# Patient Record
Sex: FEMALE | Race: WHITE | NOT HISPANIC OR LATINO | Employment: UNEMPLOYED | URBAN - METROPOLITAN AREA
[De-identification: names, ages, dates, MRNs, and addresses within clinical notes are randomized per-mention and may not be internally consistent; named-entity substitution may affect disease eponyms.]

---

## 2017-03-13 ENCOUNTER — HISTORICAL (OUTPATIENT)
Dept: ADMINISTRATIVE | Facility: HOSPITAL | Age: 57
End: 2017-03-13

## 2017-03-13 LAB
ALBUMIN SERPL-MCNC: 4.5 G/DL (ref 3.1–4.7)
ALP SERPL-CCNC: 94 IU/L (ref 40–104)
ALT (SGPT): 23 IU/L (ref 3–33)
AST SERPL-CCNC: 27 IU/L (ref 10–40)
BASOPHILS NFR BLD: 0.1 K/UL (ref 0–0.2)
BASOPHILS NFR BLD: 0.9 %
BILIRUB SERPL-MCNC: 1.7 MG/DL (ref 0.3–1)
BUN SERPL-MCNC: 12 MG/DL (ref 8–20)
CALCIUM SERPL-MCNC: 9.6 MG/DL (ref 7.7–10.4)
CHLORIDE: 102 MMOL/L (ref 98–110)
CO2 SERPL-SCNC: 26.8 MMOL/L (ref 22.8–31.6)
CREATININE: 0.59 MG/DL (ref 0.6–1.4)
CRP SERPL-MCNC: 0.49 MG/DL (ref 0–1.4)
EOSINOPHIL NFR BLD: 0 K/UL (ref 0–0.7)
EOSINOPHIL NFR BLD: 0.6 %
ERYTHROCYTE [DISTWIDTH] IN BLOOD BY AUTOMATED COUNT: 13.3 % (ref 11.7–14.9)
GLUCOSE: 107 MG/DL (ref 70–99)
GRAN #: 3.5 K/UL (ref 1.4–6.5)
GRAN%: 52.8 %
HCT VFR BLD AUTO: 36.5 % (ref 36–48)
HGB BLD-MCNC: 12.3 G/DL (ref 12–15)
IMMATURE GRANS (ABS): 0 K/UL (ref 0–1)
IMMATURE GRANULOCYTES: 0.3 %
LYMPH #: 2.5 K/UL (ref 1.2–3.4)
LYMPH%: 38.1 %
MCH RBC QN AUTO: 28 PG (ref 25–35)
MCHC RBC AUTO-ENTMCNC: 33.7 G/DL (ref 31–36)
MCV RBC AUTO: 83 FL (ref 79–98)
MONO #: 0.5 K/UL (ref 0.1–0.6)
MONO%: 7.3 %
NUCLEATED RBCS: 0 %
PLATELET # BLD AUTO: 234 K/UL (ref 140–440)
PMV BLD AUTO: 10.3 FL (ref 8.8–12.7)
POTASSIUM SERPL-SCNC: 4 MMOL/L (ref 3.5–5)
PROT SERPL-MCNC: 7.6 G/DL (ref 6–8.2)
RBC # BLD AUTO: 4.4 M/UL (ref 3.5–5.5)
SODIUM: 139 MMOL/L (ref 134–144)
WBC # BLD AUTO: 6.6 K/UL (ref 5–10)

## 2017-03-15 LAB — RHEUMATOID FACT SERPL-ACNC: 11.3 IU/ML (ref 0–13.9)

## 2017-07-17 ENCOUNTER — OFFICE VISIT (OUTPATIENT)
Dept: RHEUMATOLOGY | Facility: CLINIC | Age: 57
End: 2017-07-17
Payer: MEDICARE

## 2017-07-17 DIAGNOSIS — M19.90 CHRONIC INFLAMMATORY ARTHRITIS: Primary | ICD-10-CM

## 2017-07-17 DIAGNOSIS — Z79.899 ENCOUNTER FOR LONG-TERM (CURRENT) USE OF OTHER MEDICATIONS: ICD-10-CM

## 2017-07-17 LAB
ALBUMIN SERPL-MCNC: 4.5 G/DL (ref 3.1–4.7)
ALP SERPL-CCNC: 93 IU/L (ref 40–104)
ALT (SGPT): 23 IU/L (ref 3–33)
AST SERPL-CCNC: 27 IU/L (ref 10–40)
BASOPHILS NFR BLD: 0 K/UL (ref 0–0.2)
BASOPHILS NFR BLD: 0.4 %
BILIRUB SERPL-MCNC: 1.3 MG/DL (ref 0.3–1)
BUN SERPL-MCNC: 15 MG/DL (ref 8–20)
CALCIUM SERPL-MCNC: 9.8 MG/DL (ref 7.7–10.4)
CHLORIDE: 103 MMOL/L (ref 98–110)
CO2 SERPL-SCNC: 24.1 MMOL/L (ref 22.8–31.6)
CREATININE: 0.62 MG/DL (ref 0.6–1.4)
CRP SERPL-MCNC: 0.65 MG/DL (ref 0–1.4)
EOSINOPHIL NFR BLD: 0 K/UL (ref 0–0.7)
EOSINOPHIL NFR BLD: 0.6 %
ERYTHROCYTE [DISTWIDTH] IN BLOOD BY AUTOMATED COUNT: 13.2 % (ref 11.7–14.9)
GLUCOSE: 208 MG/DL (ref 70–99)
GRAN #: 4.5 K/UL (ref 1.4–6.5)
GRAN%: 62.5 %
HCT VFR BLD AUTO: 37.1 % (ref 36–48)
HGB BLD-MCNC: 12.6 G/DL (ref 12–15)
IMMATURE GRANS (ABS): 0 K/UL (ref 0–1)
IMMATURE GRANULOCYTES: 0.1 %
LYMPH #: 2.2 K/UL (ref 1.2–3.4)
LYMPH%: 30.3 %
MCH RBC QN AUTO: 28.1 PG (ref 25–35)
MCHC RBC AUTO-ENTMCNC: 34 G/DL (ref 31–36)
MCV RBC AUTO: 82.6 FL (ref 79–98)
MONO #: 0.4 K/UL (ref 0.1–0.6)
MONO%: 6.1 %
NUCLEATED RBCS: 0 %
PLATELET # BLD AUTO: 218 K/UL (ref 140–440)
PMV BLD AUTO: 10.5 FL (ref 8.8–12.7)
POTASSIUM SERPL-SCNC: 3.1 MMOL/L (ref 3.5–5)
PROT SERPL-MCNC: 7.9 G/DL (ref 6–8.2)
RBC # BLD AUTO: 4.49 M/UL (ref 3.5–5.5)
SODIUM: 140 MMOL/L (ref 134–144)
WBC # BLD AUTO: 7.3 K/UL (ref 5–10)

## 2017-07-17 PROCEDURE — 99213 OFFICE O/P EST LOW 20 MIN: CPT | Mod: ,,, | Performed by: INTERNAL MEDICINE

## 2017-07-17 RX ORDER — METHOTREXATE 2.5 MG/1
TABLET ORAL
COMMUNITY
Start: 2014-09-17 | End: 2018-03-28 | Stop reason: SDUPTHER

## 2017-07-17 RX ORDER — FOLIC ACID 1 MG/1
TABLET ORAL
COMMUNITY

## 2017-07-17 RX ORDER — GABAPENTIN 100 MG/1
CAPSULE ORAL
COMMUNITY
End: 2020-06-16 | Stop reason: DRUGHIGH

## 2017-07-17 RX ORDER — RAMIPRIL 2.5 MG/1
CAPSULE ORAL
COMMUNITY

## 2017-07-17 RX ORDER — METHOCARBAMOL 750 MG/1
TABLET, FILM COATED ORAL
Refills: 1 | COMMUNITY
Start: 2017-05-24 | End: 2018-03-28 | Stop reason: DRUGHIGH

## 2017-07-17 RX ORDER — DULOXETIN HYDROCHLORIDE 30 MG/1
CAPSULE, DELAYED RELEASE ORAL
COMMUNITY

## 2017-07-17 RX ORDER — CLOPIDOGREL BISULFATE 75 MG/1
TABLET ORAL
COMMUNITY

## 2017-07-17 RX ORDER — HYDROCODONE BITARTRATE AND ACETAMINOPHEN 7.5; 325 MG/1; MG/1
10-325 TABLET ORAL DAILY PRN
COMMUNITY
End: 2018-03-28 | Stop reason: DRUGHIGH

## 2017-07-17 RX ORDER — ZOLPIDEM TARTRATE 10 MG/1
TABLET ORAL
COMMUNITY
End: 2017-08-07 | Stop reason: SDUPTHER

## 2017-07-17 RX ORDER — METFORMIN HYDROCHLORIDE 500 MG/1
TABLET ORAL
COMMUNITY

## 2017-07-17 NOTE — PROGRESS NOTES
[facility] RHEUMATOLOGY           Follow-up visit      Subjective:       Patient ID:   NAME: Gillian Ramírez : 1960     57 y.o. female    Referring Doc: No ref. provider found  Other Physicians:    Chief Complaint:  Rheumatoid Arthritis, Osteoarthritis- her chief complaint is ongoing pain of the right knee    HPI/Interval History:   In general, the patient is doing well with regard to her inflammatory arthritis. She has had no new joint swelling, no joint redness and very limited joint pain. She has however had ongoing problems with the right knee which we know is secondary to osteoarthritis. She is seeing her orthopedist, Dr. Quiñones, and they are ably planning on performing a TKR on that side. They have not however addressed her very significant cardiac issues and so I believe the surgery is questionable.          ROS:   GEN: no fever, night sweats or weight loss  SKIN:  no rashes , erythema, bruising, or swelling, no Raynauds, no photosensitivity  HEENT: no HAs, no changes in vision, no mouth ulcers, no sicca symptoms, no scalp tenderness, jaw claudication.  CV: no CP, SOB, PND, SHARIF or orthopnea,no palpitations  PULM: no SOB, cough, hemoptysis, sputum or pleuritic pain  GI: no abdominal pain, nausea, vomiting, constipation, diarrhea, melanotic stools, BRBPR, or hematemesis.no dysphagia  : no hematuria, dysuria  NEURO:no paresthesias, headaches, visual disturbances, muscle weakness  MUSCULOSKELETAL:  Mostly right knee pain when walking too far. No new joint swelling or stiffness.  PSYCH: No insomnia, no significant depression, no anxiety    Medications:    Current Outpatient Prescriptions:     clopidogrel (PLAVIX) 75 mg tablet, Take by mouth., Disp: , Rfl:     duloxetine (CYMBALTA) 30 MG capsule, Take by mouth., Disp: , Rfl:     etanercept (ENBREL SURECLICK) 50 mg/mL (0.98 mL) PnIj, Inject into the skin., Disp: , Rfl:     folic acid (FOLVITE) 1 MG tablet, Take by mouth., Disp: , Rfl:     gabapentin  (NEURONTIN) 100 MG capsule, Take by mouth., Disp: , Rfl:     hydrocodone-acetaminophen 7.5-325mg (NORCO) 7.5-325 mg per tablet, Take  mg by mouth daily as needed., Disp: , Rfl:     metformin (GLUCOPHAGE) 500 MG tablet, Take by mouth., Disp: , Rfl:     methocarbamol (ROBAXIN) 750 MG Tab, TAKE 1 TABLET BY MOUTH 4 TIMES A DAY AS NEEDED FOR MUSCLE SPASM, Disp: , Rfl: 1    methotrexate 2.5 MG Tab, Take by mouth., Disp: , Rfl:     ramipril (ALTACE) 2.5 MG capsule, Take by mouth., Disp: , Rfl:     ROSUVASTATIN CALCIUM (ROSUVASTATIN ORAL), Take 20 mg by mouth once daily., Disp: , Rfl:     zolpidem (AMBIEN) 10 mg Tab, Take by mouth., Disp: , Rfl:   FAMILY HISTORY: negative for Connective Tissue Disease        Review of patient's allergies indicates:  No Known Allergies          Objective:     Vitals:  There were no vitals taken for this visit.    Physical Examination:   GEN: wn/wd female in no apparent distress; AAOx3  SKIN: no rashes, no lesions, no sclerodactyly, no Raynaud's, no periungual erythema  HEAD: no alopecia, no scalp tenderness, no temporal artery tenderness or induration.  EYES: no pallor, no icterus, PERRLA  ENT:  no thrush, no mucosal dryness or ulcerations, adequate oral hygiene & dentition.  NECK: supple x 6, no masses, no thyromegaly, no lymphadenopathy.  CV:   S1 and S2 regular, no murmurs, gallop or rubs  CHEST: Normal respiratory effort;  normal breath sounds/no adventitious sounds. No signs of consolidation.  ABD: non-tender and non-distended; soft; normal bowel sounds; no rebound/guarding or tenderness. No hepatosplenomegaly.  Musculoskeletal:   Chronic synovial proliferation and stable.significant chronic deformities are still present in both hands. There does not appear to be any active synovitis at this time.  EXTREM: no clubbing, cyanosis or edema. normal pulses.  NEURO: grossly intact; motor/sensory WNL; AAOx3; no tremors  PSYCH: normal mood, affect and behavior            Labs:    Lab Results   Component Value Date    WBC 6.6 03/13/2017    HGB 12.3 03/13/2017    HCT 36.5 03/13/2017    MCV 83.0 03/13/2017     03/13/2017   CMP@  Sodium   Date Value Ref Range Status   03/13/2017 139 134 - 144 mmol/L      Potassium   Date Value Ref Range Status   03/13/2017 4.0 3.5 - 5.0 mmol/L      Chloride   Date Value Ref Range Status   03/13/2017 102 98 - 110 mmol/L      CO2   Date Value Ref Range Status   03/13/2017 26.8 22.8 - 31.6 mmol/L      Glucose   Date Value Ref Range Status   03/13/2017 107 (H) 70 - 99 mg/dL      BUN, Bld   Date Value Ref Range Status   03/13/2017 12 8 - 20 mg/dL      Creatinine   Date Value Ref Range Status   03/13/2017 0.59 (L) 0.60 - 1.40 mg/dL      Calcium   Date Value Ref Range Status   03/13/2017 9.6 7.7 - 10.4 mg/dL      Total Protein   Date Value Ref Range Status   03/13/2017 7.6 6.0 - 8.2 g/dL      Albumin   Date Value Ref Range Status   03/13/2017 4.5 3.1 - 4.7 g/dL      Total Bilirubin   Date Value Ref Range Status   03/13/2017 1.7 (H) 0.3 - 1.0 mg/dL      Alkaline Phosphatase   Date Value Ref Range Status   03/13/2017 94 40 - 104 IU/L      AST   Date Value Ref Range Status   03/13/2017 27 10 - 40 IU/L      CRP   Date Value Ref Range Status   03/13/2017 0.49 0.00 - 1.40 mg/dL      Rheumatoid Factor   Date Value Ref Range Status   03/13/2017 11.3 0.0 - 13.9 IU/mL      Comment:     Performed at: [facility], [facility] [address], [id number] Can MD, Phone:  [phone]         Radiology/Diagnostic Studies:    No new x-ray studies were provided to me.    Assessment/Discussion/Plan:   57 y.o. female with inflammatory arthritis plus osteoarthritis, end-stage of the right knee.        PLAN:  She has adequate control of her inflammatory arthritis with Biologics. Her degenerative disease continues to worsen and she will soon have to confront the possibility of surgery. She of course understands that no surgery will be performed unless  her cardiologist clears her.  All routine blood testing was obtained today.      RTC:  I will see her back in 4 months.      Electronically signed by Jed Knox MD

## 2017-07-19 LAB — RHEUMATOID FACT SERPL-ACNC: <10 IU/ML (ref 0–13.9)

## 2017-07-20 ENCOUNTER — TELEPHONE (OUTPATIENT)
Dept: RHEUMATOLOGY | Facility: CLINIC | Age: 57
End: 2017-07-20

## 2017-07-20 NOTE — TELEPHONE ENCOUNTER
Pt notified of low potassium level. Pt to contact cardiologist for treatment /instructions. Pt verbalizes understanding of results and instructions to f/u with cardiologist. shama

## 2017-08-07 DIAGNOSIS — G47.00 INSOMNIA, UNSPECIFIED TYPE: Primary | ICD-10-CM

## 2017-08-07 RX ORDER — ZOLPIDEM TARTRATE 5 MG/1
5 TABLET ORAL NIGHTLY
Qty: 30 TABLET | Refills: 5 | Status: SHIPPED | OUTPATIENT
Start: 2017-08-07 | End: 2018-02-05 | Stop reason: SDUPTHER

## 2017-11-17 RX ORDER — ETANERCEPT 50 MG/ML
SOLUTION SUBCUTANEOUS
Qty: 4 SYRINGE | Refills: 5 | Status: SHIPPED | OUTPATIENT
Start: 2017-11-17 | End: 2019-06-05

## 2017-11-29 ENCOUNTER — OFFICE VISIT (OUTPATIENT)
Dept: RHEUMATOLOGY | Facility: CLINIC | Age: 57
End: 2017-11-29
Payer: MEDICARE

## 2017-11-29 ENCOUNTER — TELEPHONE (OUTPATIENT)
Dept: RHEUMATOLOGY | Facility: CLINIC | Age: 57
End: 2017-11-29

## 2017-11-29 VITALS
DIASTOLIC BLOOD PRESSURE: 76 MMHG | WEIGHT: 178.31 LBS | BODY MASS INDEX: 31.58 KG/M2 | SYSTOLIC BLOOD PRESSURE: 136 MMHG

## 2017-11-29 DIAGNOSIS — M05.79 RHEUMATOID ARTHRITIS INVOLVING MULTIPLE SITES WITH POSITIVE RHEUMATOID FACTOR: Primary | ICD-10-CM

## 2017-11-29 DIAGNOSIS — M05.79 RHEUMATOID ARTHRITIS OF MULTIPLE SITES WITHOUT ORGAN OR SYSTEM INVOLVEMENT WITH POSITIVE RHEUMATOID FACTOR: Primary | ICD-10-CM

## 2017-11-29 DIAGNOSIS — Z79.899 ENCOUNTER FOR LONG-TERM (CURRENT) DRUG USE: ICD-10-CM

## 2017-11-29 DIAGNOSIS — Z79.899 ENCOUNTER FOR LONG-TERM (CURRENT) USE OF HIGH-RISK MEDICATION: ICD-10-CM

## 2017-11-29 DIAGNOSIS — I25.118 CORONARY ARTERY DISEASE WITH EXERTIONAL ANGINA: ICD-10-CM

## 2017-11-29 LAB
ALBUMIN SERPL-MCNC: 4.1 G/DL (ref 3.1–4.7)
ALP SERPL-CCNC: 84 IU/L (ref 40–104)
ALT (SGPT): 24 IU/L (ref 3–33)
AST SERPL-CCNC: 32 IU/L (ref 10–40)
BASOPHILS NFR BLD: 0 K/UL (ref 0–0.2)
BASOPHILS NFR BLD: 0.5 %
BILIRUB SERPL-MCNC: 1.3 MG/DL (ref 0.3–1)
BUN SERPL-MCNC: 9 MG/DL (ref 8–20)
CALCIUM SERPL-MCNC: 9.3 MG/DL (ref 7.7–10.4)
CHLORIDE: 105 MMOL/L (ref 98–110)
CK SERPL-CCNC: 88 IU/L (ref 13–135)
CO2 SERPL-SCNC: 23.7 MMOL/L (ref 22.8–31.6)
CREATININE: 0.55 MG/DL (ref 0.6–1.4)
CRP SERPL-MCNC: 0.32 MG/DL (ref 0–1.4)
EOSINOPHIL NFR BLD: 0.1 K/UL (ref 0–0.7)
EOSINOPHIL NFR BLD: 0.8 %
ERYTHROCYTE [DISTWIDTH] IN BLOOD BY AUTOMATED COUNT: 13.4 % (ref 11.7–14.9)
GLUCOSE: 187 MG/DL (ref 70–99)
GRAN #: 3.5 K/UL (ref 1.4–6.5)
GRAN%: 58.4 %
HCT VFR BLD AUTO: 34.2 % (ref 36–48)
HGB BLD-MCNC: 11.5 G/DL (ref 12–15)
IMMATURE GRANS (ABS): 0 K/UL (ref 0–1)
IMMATURE GRANULOCYTES: 0.5 %
LYMPH #: 2.1 K/UL (ref 1.2–3.4)
LYMPH%: 34.6 %
MCH RBC QN AUTO: 28 PG (ref 25–35)
MCHC RBC AUTO-ENTMCNC: 33.6 G/DL (ref 31–36)
MCV RBC AUTO: 83.2 FL (ref 79–98)
MONO #: 0.3 K/UL (ref 0.1–0.6)
MONO%: 5.2 %
NUCLEATED RBCS: 0 %
PLATELET # BLD AUTO: 244 K/UL (ref 140–440)
PMV BLD AUTO: 9.9 FL (ref 8.8–12.7)
POTASSIUM SERPL-SCNC: 3.7 MMOL/L (ref 3.5–5)
PROT SERPL-MCNC: 7.1 G/DL (ref 6–8.2)
RBC # BLD AUTO: 4.11 M/UL (ref 3.5–5.5)
SODIUM: 139 MMOL/L (ref 134–144)
WBC # BLD AUTO: 6 K/UL (ref 5–10)

## 2017-11-29 PROCEDURE — 99214 OFFICE O/P EST MOD 30 MIN: CPT | Mod: ,,, | Performed by: INTERNAL MEDICINE

## 2017-11-29 NOTE — PROGRESS NOTES
Please send a copy of these blood tests to the patient's cardiologist, Dr. Chaudhry. Call and verify that these results have been received.. Let them know that the patient was here this morning with what sounds like exertional angina and that she was told to see the doctor today or to present to the emergency room. Document.  Thank you

## 2017-11-29 NOTE — PROGRESS NOTES
[facility] RHEUMATOLOGY           Follow-up visit      Subjective:       Patient ID:   NAME: Gillian Ramírez : 1960     57 y.o. female    Referring Doc: No ref. provider found  Other Physicians:    Chief Complaint:  chronic inflammatory arthritis      HPI/Interval History:    The patient is having no problems with her arthritis. She denies any red, hot, and/or swollen joints. She has been having some burning sensation in her chest. This is clearly, in her opinion, exertional. She relates having had similar discomfort just prior to her first bypass surgery. She apparently had an angiogram routinely a month ago and was told she had several blockages though none were significant enough to stent. She has contacted the office of her cardiologist, Dr. Chaudhry, and is hoping to see him this week.          ROS:   GEN:    no fever, night sweats or weight loss  SKIN:   no rashes , erythema, bruising, or swelling, no Raynauds, no photosensitivity  HEENT: no HAs, no changes in vision, no mouth ulcers, no sicca symptoms, no scalp tenderness, jaw claudication.  CV:        ++ CP, no SOB, PND, SHARIF or orthopnea,no palpitations  PULM:    no SOB, cough, hemoptysis, sputum or pleuritic pain  GI:      no abdominal pain, nausea, vomiting, constipation, diarrhea, melanotic stools, BRBPR, or hematemesis, no dysphagia, no GERD  :     no hematuria, dysuria  NEURO: no paresthesias, headaches, visual disturbances  MUSCULOSKELETAL:  no muscle or joint problems  PSYCH:   No insomnia, no significant depression, no anxiety    Medications:    Current Outpatient Prescriptions:     clopidogrel (PLAVIX) 75 mg tablet, Take by mouth., Disp: , Rfl:     duloxetine (CYMBALTA) 30 MG capsule, Take by mouth., Disp: , Rfl:     ENBREL SURECLICK 50 mg/mL (0.98 mL) PnIj, INJECT 1 PEN (50MG) SUBCUTANEOUSLY ONCE A WEEK, Disp: 4 Syringe, Rfl: 5    folic acid (FOLVITE) 1 MG tablet, Take by mouth., Disp: , Rfl:     gabapentin (NEURONTIN) 100 MG capsule,  Take by mouth., Disp: , Rfl:     hydrocodone-acetaminophen 7.5-325mg (NORCO) 7.5-325 mg per tablet, Take  mg by mouth daily as needed., Disp: , Rfl:     metformin (GLUCOPHAGE) 500 MG tablet, Take by mouth., Disp: , Rfl:     methocarbamol (ROBAXIN) 750 MG Tab, TAKE 1 TABLET BY MOUTH 4 TIMES A DAY AS NEEDED FOR MUSCLE SPASM, Disp: , Rfl: 1    methotrexate 2.5 MG Tab, Take by mouth., Disp: , Rfl:     ramipril (ALTACE) 2.5 MG capsule, Take by mouth., Disp: , Rfl:     ROSUVASTATIN CALCIUM (ROSUVASTATIN ORAL), Take 20 mg by mouth once daily., Disp: , Rfl:     zolpidem (AMBIEN) 5 MG Tab, Take 1 tablet (5 mg total) by mouth every evening. DOSE DECREASE!, Disp: 30 tablet, Rfl: 5      FAMILY HISTORY: negative for Connective Tissue Disease        Review of patient's allergies indicates:  No Known Allergies          Objective:     Vitals:  Blood pressure 136/76, weight 80.9 kg (178 lb 4.8 oz).    Physical Examination:   GEN: wn/wd female in no apparent distress  SKIN: no rashes, no lesions, no sclerodactyly, no Raynaud's, no periungual erythema  HEAD: no alopecia, no scalp tenderness, no temporal artery tenderness or induration.  EYES: no pallor, no icterus, PERRLA  ENT:  no thrush, no mucosal dryness or ulcerations, adequate oral hygiene & dentition.  NECK: supple x 6, no masses, no thyromegaly, no lymphadenopathy.  CV:   S1 and S2 regular, no murmurs, gallop or rubs  CHEST: Normal respiratory effort;  normal breath sounds/no adventitious sounds. No signs of consolidation.  ABD: non-tender and non-distended; soft; normal bowel sounds; no rebound/guarding or tenderness. No hepatosplenomegaly.  Musculoskeletal:  No evidence of any active synovitis  EXTREM: no clubbing, cyanosis or edema. normal pulses.  NEURO: grossly intact; motor/sensory WNL; AAOx3; no tremors  PSYCH:  normal mood, affect and behavior            Labs:   Lab Results   Component Value Date    WBC 6.0 11/29/2017    HGB 11.5 (L) 11/29/2017    HCT 34.2  (L) 11/29/2017    MCV 83.2 11/29/2017     11/29/2017   CMP@  Sodium   Date Value Ref Range Status   11/29/2017 139 134 - 144 mmol/L      Potassium   Date Value Ref Range Status   11/29/2017 3.7 3.5 - 5.0 mmol/L      Chloride   Date Value Ref Range Status   11/29/2017 105 98 - 110 mmol/L      CO2   Date Value Ref Range Status   11/29/2017 23.7 22.8 - 31.6 mmol/L      Glucose   Date Value Ref Range Status   11/29/2017 187 (H) 70 - 99 mg/dL      BUN, Bld   Date Value Ref Range Status   07/17/2017 15 8 - 20 mg/dL      Creatinine   Date Value Ref Range Status   07/17/2017 0.62 0.60 - 1.40 mg/dL      Calcium   Date Value Ref Range Status   11/29/2017 9.3 7.7 - 10.4 mg/dL      Total Protein   Date Value Ref Range Status   07/17/2017 7.9 6.0 - 8.2 g/dL      Albumin   Date Value Ref Range Status   07/17/2017 4.5 3.1 - 4.7 g/dL      Total Bilirubin   Date Value Ref Range Status   07/17/2017 1.3 (H) 0.3 - 1.0 mg/dL      Alkaline Phosphatase   Date Value Ref Range Status   07/17/2017 93 40 - 104 IU/L      AST   Date Value Ref Range Status   07/17/2017 27 10 - 40 IU/L      CRP   Date Value Ref Range Status   07/17/2017 0.65 0.00 - 1.40 mg/dL      Rheumatoid Factor   Date Value Ref Range Status   07/17/2017 <10.0 0.0 - 13.9 IU/mL      Comment:     Performed at: [facility], [facility] [address], [id number] Can MD, Phone:  [phone]         Radiology/Diagnostic Studies:    none    Assessment/Discussion/Plan:   57 y.o. female with chronic rheumatoid arthritis, seronegative-controlled.  (2) Long history of coronary artery disease since age 39, now having exertional angina.    PLAN:  I requested that the patient be taken to the [facility] emergency room She declined. She stated that she will go to her cardiologist's office when she leaves here and if she is unable to be seen, she will then go to the emergency room. She has nitroglycerin, though she has not used it. I have encouraged her to keep  it handy so that she can use it as soon as any exertional chest pain recurs.  I have drawn her regular arthritis blood tests and have included cardiac isoenzymes which I will send to her cardiologist's office as soon as they are ready.    RTC:  I will see her back in 4 months.      Electronically signed by Jed Knox MD         copy of this note will be sent to Dr. Chaudhry

## 2017-11-29 NOTE — TELEPHONE ENCOUNTER
Spoke to Jolie in Dr. Chaudhry's office and she confirmed the fax number and was informed to please provide Dr. Chaudhry with the patient's lab results as soon as they are received. Dr. Knox's comment on lab result was starred for Dr. Chaudhry's attention.  Fax was scanned into media tab.

## 2018-02-05 DIAGNOSIS — G47.00 INSOMNIA, UNSPECIFIED TYPE: ICD-10-CM

## 2018-02-05 RX ORDER — ZOLPIDEM TARTRATE 5 MG/1
TABLET ORAL
Qty: 30 TABLET | Refills: 5 | Status: SHIPPED | OUTPATIENT
Start: 2018-02-05 | End: 2018-05-07 | Stop reason: SDUPTHER

## 2018-03-28 ENCOUNTER — OFFICE VISIT (OUTPATIENT)
Dept: RHEUMATOLOGY | Facility: CLINIC | Age: 58
End: 2018-03-28
Payer: MEDICARE

## 2018-03-28 VITALS
WEIGHT: 162.19 LBS | DIASTOLIC BLOOD PRESSURE: 96 MMHG | SYSTOLIC BLOOD PRESSURE: 140 MMHG | BODY MASS INDEX: 28.73 KG/M2

## 2018-03-28 DIAGNOSIS — I25.118 CORONARY ARTERY DISEASE WITH EXERTIONAL ANGINA: ICD-10-CM

## 2018-03-28 DIAGNOSIS — M05.79 RHEUMATOID ARTHRITIS OF MULTIPLE SITES WITHOUT ORGAN OR SYSTEM INVOLVEMENT WITH POSITIVE RHEUMATOID FACTOR: Primary | ICD-10-CM

## 2018-03-28 DIAGNOSIS — Z79.899 ENCOUNTER FOR LONG-TERM (CURRENT) DRUG USE: ICD-10-CM

## 2018-03-28 PROCEDURE — 99214 OFFICE O/P EST MOD 30 MIN: CPT | Mod: ,,, | Performed by: INTERNAL MEDICINE

## 2018-03-28 RX ORDER — HYDROCODONE BITARTRATE AND ACETAMINOPHEN 10; 325 MG/1; MG/1
TABLET ORAL
COMMUNITY
Start: 2018-03-01

## 2018-03-28 RX ORDER — METHOTREXATE 2.5 MG/1
20 TABLET ORAL
Qty: 96 TABLET | Refills: 1 | Status: SHIPPED | OUTPATIENT
Start: 2018-03-28 | End: 2019-06-05

## 2018-03-28 RX ORDER — TIZANIDINE 4 MG/1
TABLET ORAL
COMMUNITY
Start: 2018-03-27

## 2018-03-28 RX ORDER — NITROGLYCERIN 20 MG/1
PATCH TRANSDERMAL
COMMUNITY
Start: 2018-03-05

## 2018-03-28 RX ORDER — METOPROLOL TARTRATE 25 MG/1
TABLET, FILM COATED ORAL 3 TIMES DAILY
COMMUNITY
Start: 2018-02-16

## 2018-03-28 RX ORDER — RANOLAZINE 500 MG/1
TABLET, FILM COATED, EXTENDED RELEASE ORAL
COMMUNITY
Start: 2018-03-24

## 2018-03-28 RX ORDER — ESOMEPRAZOLE MAGNESIUM 40 MG/1
40 CAPSULE, DELAYED RELEASE ORAL
COMMUNITY

## 2018-03-28 RX ORDER — DILTIAZEM HYDROCHLORIDE 240 MG/1
CAPSULE, COATED, EXTENDED RELEASE ORAL
COMMUNITY
Start: 2018-03-27

## 2018-03-28 RX ORDER — ROSUVASTATIN CALCIUM 20 MG/1
TABLET, COATED ORAL
COMMUNITY
Start: 2018-03-26 | End: 2020-06-16 | Stop reason: DRUGHIGH

## 2018-03-28 RX ORDER — POTASSIUM CHLORIDE 1500 MG/1
TABLET, EXTENDED RELEASE ORAL
COMMUNITY
Start: 2018-03-03

## 2018-03-28 RX ORDER — EZETIMIBE 10 MG/1
TABLET ORAL
COMMUNITY
Start: 2018-03-07

## 2018-03-28 NOTE — PROGRESS NOTES
[facility] RHEUMATOLOGY           Follow-up visit      Subjective:       Patient ID:   NAME: Gillian Ramírez : 1960     57 y.o. female    Referring Doc: No ref. provider found  Other Physicians:    Chief Complaint:  Rheumatoid Arthritis      HPI/Interval History:   The patient has been having more problems with joint pain and swelling. She has had some additional problems with the left knee. He saw orthopedics and received a cortisone injection which was helpful. About a month later, she received a hyaluronic acid injection. This too has been helpful.  She has once again had problems with her heart area it seems that she had been having very significant episodes of angina. The workup uncovered several stenoses, the tightest of which was 75% occluded. Medical management however did not succeed and she had a stent placed about 1 week ago. She now feels much better.        ROS:   GEN:    no fever, night sweats or weight loss  SKIN:   no rashes , erythema, bruising, or swelling, no Raynauds, no photosensitivity  HEENT: no HAs, no changes in vision, no mouth ulcers, no sicca symptoms, no scalp tenderness, jaw claudication.  CV:        ++ CP, SOB, no PND, + SHARIF or orthopnea, ++ palpitations  PULM: no SOB, cough, hemoptysis, sputum or pleuritic pain  GI:      no abdominal pain, nausea, vomiting, constipation, diarrhea, melanotic stools, BRBPR, or hematemesis, no dysphagia, no GERD  :     no hematuria, dysuria  NEURO: no paresthesias, headaches, visual disturbances  MUSCULOSKELETAL:  Pain and stiffness with swelling and warmth in the knuckles of both hands and in the left knee  PSYCH:   No insomnia, no significant depression, no anxiety    Medications:    Current Outpatient Prescriptions:     clopidogrel (PLAVIX) 75 mg tablet, Take by mouth., Disp: , Rfl:     diltiaZEM (CARDIZEM CD) 240 MG 24 hr capsule, , Disp: , Rfl:     duloxetine (CYMBALTA) 30 MG capsule, Take by mouth., Disp: , Rfl:     ENBREL SURECLICK 50  mg/mL (0.98 mL) AlfonsoIj, INJECT 1 PEN (50MG) SUBCUTANEOUSLY ONCE A WEEK, Disp: 4 Syringe, Rfl: 5    esomeprazole (NEXIUM) 40 MG capsule, Take 40 mg by mouth before breakfast., Disp: , Rfl:     ezetimibe (ZETIA) 10 mg tablet, , Disp: , Rfl:     folic acid (FOLVITE) 1 MG tablet, Take by mouth., Disp: , Rfl:     gabapentin (NEURONTIN) 100 MG capsule, Take by mouth., Disp: , Rfl:     hydrocodone-acetaminophen 10-325mg (NORCO)  mg Tab, , Disp: , Rfl:     KLOR-CON M20 20 mEq tablet, , Disp: , Rfl:     metformin (GLUCOPHAGE) 500 MG tablet, Take by mouth., Disp: , Rfl:     methotrexate 2.5 MG Tab, Take 8 tablets (20 mg total) by mouth every 7 days., Disp: 96 tablet, Rfl: 1    metoprolol tartrate (LOPRESSOR) 25 MG tablet, , Disp: , Rfl:     nitroGLYCERIN 0.1 mg/hr TD PT24 (NITRODUR) 0.1 mg/hr PT24, , Disp: , Rfl:     ramipril (ALTACE) 2.5 MG capsule, Take by mouth., Disp: , Rfl:     RANEXA 500 mg Tb12, , Disp: , Rfl:     rosuvastatin (CRESTOR) 20 MG tablet, , Disp: , Rfl:     tiZANidine (ZANAFLEX) 4 MG tablet, , Disp: , Rfl:     zolpidem (AMBIEN) 5 MG Tab, TAKE 1 TABLET BY MOUTH EVERY EVENING *DOSE DECREASE!*, Disp: 30 tablet, Rfl: 5      FAMILY HISTORY: negative for Connective Tissue Disease        Review of patient's allergies indicates:  No Known Allergies          Objective:     Vitals:  Blood pressure (!) 140/96, weight 73.6 kg (162 lb 3.2 oz).    Physical Examination:   GEN: wn/wd female in no apparent distress  SKIN: no rashes, no lesions, no sclerodactyly, no Raynaud's, no periungual erythema  HEAD: no alopecia, no scalp tenderness, no temporal artery tenderness or induration.  EYES: no pallor, no icterus, PERRLA  ENT:  no thrush, no mucosal dryness or ulcerations, adequate oral hygiene & dentition.  NECK: supple x 6, no masses, no thyromegaly, no lymphadenopathy.  CV:   S1 and S2 regular, no murmurs, gallop or rubs  CHEST: Normal respiratory effort;  normal breath sounds/no adventitious sounds. No  signs of consolidation.  ABD: non-tender and non-distended; soft; normal bowel sounds; no rebound/guarding or tenderness. No hepatosplenomegaly.  Musculoskeletal:  Active synovitis is noted in the left wrist and in the MCP joints of both hands. No changes noted in the PIP joints. Her chronic and advanced RA deformities are unchanged. There is a small but warm effusion noted in the left knee. There is no metatarsalgia by examination  EXTREM: no clubbing, cyanosis or edema. normal pulses.  NEURO: grossly intact; motor/sensory WNL; AAOx3; no tremors  PSYCH:  normal mood, affect and behavior            Labs:   Lab Results   Component Value Date    WBC 6.0 11/29/2017    HGB 11.5 (L) 11/29/2017    HCT 34.2 (L) 11/29/2017    MCV 83.2 11/29/2017     11/29/2017   CMP@  Sodium   Date Value Ref Range Status   11/29/2017 139 134 - 144 mmol/L      Potassium   Date Value Ref Range Status   11/29/2017 3.7 3.5 - 5.0 mmol/L      Chloride   Date Value Ref Range Status   11/29/2017 105 98 - 110 mmol/L      CO2   Date Value Ref Range Status   11/29/2017 23.7 22.8 - 31.6 mmol/L      Glucose   Date Value Ref Range Status   11/29/2017 187 (H) 70 - 99 mg/dL      BUN, Bld   Date Value Ref Range Status   11/29/2017 9 8 - 20 mg/dL      Creatinine   Date Value Ref Range Status   11/29/2017 0.55 (L) 0.60 - 1.40 mg/dL      Calcium   Date Value Ref Range Status   11/29/2017 9.3 7.7 - 10.4 mg/dL      Total Protein   Date Value Ref Range Status   11/29/2017 7.1 6.0 - 8.2 g/dL      Albumin   Date Value Ref Range Status   11/29/2017 4.1 3.1 - 4.7 g/dL      Total Bilirubin   Date Value Ref Range Status   11/29/2017 1.3 (H) 0.3 - 1.0 mg/dL      Alkaline Phosphatase   Date Value Ref Range Status   11/29/2017 84 40 - 104 IU/L      AST   Date Value Ref Range Status   11/29/2017 32 10 - 40 IU/L      CPK   Date Value Ref Range Status   11/29/2017 88 13 - 135 IU/L      CRP   Date Value Ref Range Status   11/29/2017 0.32 0.00 - 1.40 mg/dL       Rheumatoid Factor   Date Value Ref Range Status   07/17/2017 <10.0 0.0 - 13.9 IU/mL      Comment:     Performed at: [facility], [facility] [address], [place], [place], [id number] Can MD, Phone:  [phone]         Radiology/Diagnostic Studies:    none    Assessment/Discussion/Plan:   57 y.o. female with rheumatoid arthritis, seronegative-slight background activity despite Enbrel plus methotrexate 12.5 mg weekly      PLAN:  I'm going to increase her methotrexate from 12.5 mg weekly to a new dose of 20 mg weekly. I'm not going to use steroids given her multiple co-morbidities, most especially her heart disease. I did mention the role that Enbrel may play in aggravating preexistent heart disease, particularly congestive heart failure (which she does not have at the current time). Despite the warnings, she is decidedly against any change that would remove the biologic.  Lab testing has been done in [place] and we have sent for a copy of that.    RTC:  I will see her back in 3 months.      Electronically signed by Jed Knox MD

## 2018-03-28 NOTE — PATIENT INSTRUCTIONS
Etanercept injection  What is this medicine?  ETANERCEPT (et a NER sept) is used for the treatment of rheumatoid arthritis in adults and children. The medicine is also used to treat psoriatic arthritis, ankylosing spondylitis, and psoriasis.  How should I use this medicine?  The medicine is given by injection under the skin. You will be taught how to prepare and give this medicine. Use exactly as directed. Take your medicine at regular intervals. Do not take your medicine more often than directed.  It is important that you put your used needles and syringes in a special sharps container. Do not put them in a trash can. If you do not have a sharps container, call your pharmacist or healthcare provider to get one.  A special MedGuide will be given to you by the pharmacist with each prescription and refill. Be sure to read this information carefully each time.  Talk to your pediatrician regarding the use of this medicine in children. While this drug may be prescribed for children as young as 4 years of age for selected conditions, precautions do apply.  What side effects may I notice from receiving this medicine?  Side effects that you should report to your doctor or health care professional as soon as possible:  · allergic reactions like skin rash, itching or hives, swelling of the face, lips, or tongue  · changes in vision  · fever, chills or any other sign of infection  · numbness or tingling in legs or other parts of the body  · red, scaly patches or raised bumps on the skin  · shortness of breath or difficulty breathing  · swollen lymph nodes in the neck, underarm, or groin areas  · unexplained weight loss  · unusual bleeding or bruising  · unusual swelling or fluid retention in the legs  · unusually weak or tired  Side effects that usually do not require medical attention (report to your doctor or health care professional if they continue or are bothersome):  · dizziness  · headache  · nausea  · redness,  itching, or swelling at the injection site  · vomiting  What may interact with this medicine?  Do not take this medicine with any of the following medications:  · anakinra  This medicine may also interact with the following medications:  · cyclophosphamide  · sulfasalazine  · vaccines  What if I miss a dose?  If you miss a dose, contact your health care professional to find out when you should take your next dose. Do not take double or extra doses without advice.  Where should I keep my medicine?  Keep out of the reach of children.  Store between 2 and 8 degrees C (36 and 46 degrees F). Do not freeze or shake. Protect from light. Throw away any unused medicine after the expiration date.  You will be instructed on how to store this medicine.  What should I tell my health care provider before I take this medicine?  They need to know if you have any of these conditions:  · blood disorders  · cancer  · congestive heart failure  · diabetes  · exposure to chickenpox  · immune system problems  · infection  · multiple sclerosis  · seizure disorder  · tuberculosis, a positive skin test for tuberculosis or have recently been in close contact with someone who has tuberculosis  · Wegener's granulomatosis  · an unusual or allergic reaction to etanercept, latex, other medicines, foods, dyes, or preservatives  · pregnant or trying to get pregnant  · breast-feeding  What should I watch for while using this medicine?  Tell your doctor or healthcare professional if your symptoms do not start to get better or if they get worse.  You will be tested for tuberculosis (TB) before you start this medicine. If your doctor prescribes any medicine for TB, you should start taking the TB medicine before starting this medicine. Make sure to finish the full course of TB medicine.  Call your doctor or health care professional for advice if you get a fever, chills or sore throat, or other symptoms of a cold or flu. Do not treat yourself. This drug  decreases your body's ability to fight infections. Try to avoid being around people who are sick.  NOTE:This sheet is a summary. It may not cover all possible information. If you have questions about this medicine, talk to your doctor, pharmacist, or health care provider. Copyright© 2017 [employer]

## 2018-05-07 DIAGNOSIS — G47.00 INSOMNIA, UNSPECIFIED TYPE: ICD-10-CM

## 2018-05-07 RX ORDER — ZOLPIDEM TARTRATE 5 MG/1
TABLET ORAL
Qty: 30 TABLET | Refills: 5 | Status: SHIPPED | OUTPATIENT
Start: 2018-05-07 | End: 2019-06-05

## 2019-05-20 DIAGNOSIS — M05.79 RHEUMATOID ARTHRITIS INVOLVING MULTIPLE SITES WITH POSITIVE RHEUMATOID FACTOR: Primary | ICD-10-CM

## 2019-05-20 RX ORDER — ETANERCEPT 50 MG/ML
SOLUTION SUBCUTANEOUS
Qty: 4 SYRINGE | Refills: 5 | OUTPATIENT
Start: 2019-05-20

## 2019-06-05 ENCOUNTER — OFFICE VISIT (OUTPATIENT)
Dept: RHEUMATOLOGY | Facility: CLINIC | Age: 59
End: 2019-06-05
Payer: MEDICARE

## 2019-06-05 VITALS — SYSTOLIC BLOOD PRESSURE: 113 MMHG | WEIGHT: 159.5 LBS | BODY MASS INDEX: 28.25 KG/M2 | DIASTOLIC BLOOD PRESSURE: 70 MMHG

## 2019-06-05 DIAGNOSIS — Z79.899 ENCOUNTER FOR LONG-TERM (CURRENT) DRUG USE: ICD-10-CM

## 2019-06-05 DIAGNOSIS — L40.50 PSORIATIC ARTHRITIS: ICD-10-CM

## 2019-06-05 DIAGNOSIS — I25.118 CORONARY ARTERY DISEASE WITH EXERTIONAL ANGINA: ICD-10-CM

## 2019-06-05 DIAGNOSIS — M19.90 CHRONIC INFLAMMATORY ARTHRITIS: Primary | ICD-10-CM

## 2019-06-05 LAB
ALBUMIN SERPL-MCNC: 3.8 G/DL (ref 3.1–4.7)
ALP SERPL-CCNC: 109 IU/L (ref 40–104)
ALT (SGPT): 11 IU/L (ref 3–33)
AST SERPL-CCNC: 19 IU/L (ref 10–40)
BASOPHILS NFR BLD: 0 K/UL (ref 0–0.2)
BASOPHILS NFR BLD: 0.5 %
BILIRUB SERPL-MCNC: 1 MG/DL (ref 0.3–1)
BUN SERPL-MCNC: 11 MG/DL (ref 8–20)
CALCIUM SERPL-MCNC: 9.5 MG/DL (ref 7.7–10.4)
CHLORIDE: 100 MMOL/L (ref 98–110)
CO2 SERPL-SCNC: 26.4 MMOL/L (ref 22.8–31.6)
CREATININE: 0.69 MG/DL (ref 0.6–1.4)
CRP SERPL-MCNC: 2.96 MG/DL (ref 0–1.4)
EOSINOPHIL NFR BLD: 0 K/UL (ref 0–0.7)
EOSINOPHIL NFR BLD: 0.3 %
ERYTHROCYTE [DISTWIDTH] IN BLOOD BY AUTOMATED COUNT: 14.6 % (ref 11.7–14.9)
GLUCOSE: 152 MG/DL (ref 70–99)
GRAN #: 4.5 K/UL (ref 1.4–6.5)
GRAN%: 58.6 %
HCT VFR BLD AUTO: 34.6 % (ref 36–48)
HGB BLD-MCNC: 10.9 G/DL (ref 12–15)
IMMATURE GRANS (ABS): 0 K/UL (ref 0–1)
IMMATURE GRANULOCYTES: 0.1 %
LYMPH #: 2.6 K/UL (ref 1.2–3.4)
LYMPH%: 33.6 %
MCH RBC QN AUTO: 26.1 PG (ref 25–35)
MCHC RBC AUTO-ENTMCNC: 31.5 G/DL (ref 31–36)
MCV RBC AUTO: 83 FL (ref 79–98)
MONO #: 0.5 K/UL (ref 0.1–0.6)
MONO%: 6.9 %
NUCLEATED RBCS: 0 %
PLATELET # BLD AUTO: 290 K/UL (ref 140–440)
PMV BLD AUTO: 9.6 FL (ref 8.8–12.7)
POTASSIUM SERPL-SCNC: 3.7 MMOL/L (ref 3.5–5)
PROT SERPL-MCNC: 7.8 G/DL (ref 6–8.2)
RBC # BLD AUTO: 4.17 M/UL (ref 3.5–5.5)
SODIUM: 136 MMOL/L (ref 134–144)
WBC # BLD AUTO: 7.7 K/UL (ref 5–10)

## 2019-06-05 PROCEDURE — 99213 OFFICE O/P EST LOW 20 MIN: CPT | Mod: ,,, | Performed by: INTERNAL MEDICINE

## 2019-06-05 PROCEDURE — 99213 PR OFFICE/OUTPT VISIT, EST, LEVL III, 20-29 MIN: ICD-10-PCS | Mod: ,,, | Performed by: INTERNAL MEDICINE

## 2019-06-05 PROCEDURE — 3008F BODY MASS INDEX DOCD: CPT | Mod: ,,, | Performed by: INTERNAL MEDICINE

## 2019-06-05 PROCEDURE — 3008F PR BODY MASS INDEX (BMI) DOCUMENTED: ICD-10-PCS | Mod: ,,, | Performed by: INTERNAL MEDICINE

## 2019-06-05 RX ORDER — ETANERCEPT 50 MG/ML
50 SOLUTION SUBCUTANEOUS
Qty: 12 SYRINGE | Refills: 3 | Status: SHIPPED | OUTPATIENT
Start: 2019-06-05 | End: 2019-06-11 | Stop reason: SDUPTHER

## 2019-06-05 NOTE — PROGRESS NOTES
[facility] RHEUMATOLOGY           Follow-up visit    Notes dictated via Dragon to EPIC. Please forgive any unintended errors.  Subjective:       Patient ID:   NAME: Gillian Ramírez : 1960     58 y.o. female    Referring Doc: No ref. provider found  Other Physicians:    Chief Complaint:  Rheumatoid Arthritis      HPI/Interval History:   The patient is doing well with regard to her rheumatoid. She has been on Enbrel except for periods when she has had upper respiratory/ear infections. Otolaryngology plans to remove a bone spur from her right ethmoid sinus. She has not had any red, hot, and/or swollen joints though she has had progressive knee pain. She was to have had a left total knee replacement by Dr. Li but that was canceled due to another coronary stenosis (stented).          ROS:   GEN:    no fever, night sweats or weight loss  SKIN:   no rashes , erythema, bruising, or swelling, no Raynauds, no photosensitivity  HEENT: no changes in vision, no mouth ulcers, no sicca symptoms, no scalp tenderness, no jaw claudication.  CV:      no CP, PND, SHARIF or orthopnea, no palpitations  PULM: no SOB, cough, hemoptysis, sputum or pleuritic pain  GI:       no GERD, no dysphagia, no abdominal pain, nausea, vomiting, constipation, diarrhea, melanotic stools, BRBPR, or hematemesis  :      no hematuria, dysuria  NEURO: no paresthesias, headaches, acute visual disturbances  MUSCULOSKELETAL:  Joint pain as above  PSYCH:   No insomnia, no increased anxiety or depression    Past Medical/Surgical History:  Past Medical History:   Diagnosis Date    CAD (coronary artery disease)     Psoriatic arthritis     Rheumatoid arthritis      Past Surgical History:   Procedure Laterality Date    ARTERIAL BYPASS SURGRY      stents x 5    BACK SURGERY      fusion L5-S1    CHOLECYSTECTOMY      HYSTERECTOMY      partial       Allergies:  Review of patient's allergies indicates:  No Known Allergies    Social/Family History:  Social  History     Socioeconomic History    Marital status: Single     Spouse name: Not on file    Number of children: Not on file    Years of education: Not on file    Highest education level: Not on file   Occupational History    Not on file   Social Needs    Financial resource strain: Not on file    Food insecurity:     Worry: Not on file     Inability: Not on file    Transportation needs:     Medical: Not on file     Non-medical: Not on file   Tobacco Use    Smoking status: Former Smoker     Packs/day: 1.00     Years: 10.00     Pack years: 10.00     Types: Cigarettes     Last attempt to quit:      Years since quittin.4    Smokeless tobacco: Never Used   Substance and Sexual Activity    Alcohol use: No    Drug use: No    Sexual activity: Not on file   Lifestyle    Physical activity:     Days per week: Not on file     Minutes per session: Not on file    Stress: Not on file   Relationships    Social connections:     Talks on phone: Not on file     Gets together: Not on file     Attends Jehovah's witness service: Not on file     Active member of club or organization: Not on file     Attends meetings of clubs or organizations: Not on file     Relationship status: Not on file   Other Topics Concern    Not on file   Social History Narrative    Not on file     Family History   Problem Relation Age of Onset    Heart disease Mother     Diabetes Mellitus Mother     Heart disease Father      FAMILY HISTORY: negative for Connective Tissue Disease      Medications:    Current Outpatient Medications:     clopidogrel (PLAVIX) 75 mg tablet, Take by mouth., Disp: , Rfl:     diltiaZEM (CARDIZEM CD) 240 MG 24 hr capsule, , Disp: , Rfl:     duloxetine (CYMBALTA) 30 MG capsule, Take by mouth., Disp: , Rfl:     ENBREL SURECLICK 50 mg/mL (0.98 mL) PnIj, Inject 50 mg into the skin every 7 days., Disp: 12 Syringe, Rfl: 3    esomeprazole (NEXIUM) 40 MG capsule, Take 40 mg by mouth before breakfast., Disp: , Rfl:      ezetimibe (ZETIA) 10 mg tablet, , Disp: , Rfl:     folic acid (FOLVITE) 1 MG tablet, Take by mouth., Disp: , Rfl:     gabapentin (NEURONTIN) 100 MG capsule, Take by mouth., Disp: , Rfl:     hydrocodone-acetaminophen 10-325mg (NORCO)  mg Tab, , Disp: , Rfl:     metformin (GLUCOPHAGE) 500 MG tablet, Take by mouth., Disp: , Rfl:     metoprolol tartrate (LOPRESSOR) 25 MG tablet, , Disp: , Rfl:     nitroGLYCERIN 0.1 mg/hr TD PT24 (NITRODUR) 0.1 mg/hr PT24, , Disp: , Rfl:     ramipril (ALTACE) 2.5 MG capsule, Take by mouth., Disp: , Rfl:     RANEXA 500 mg Tb12, , Disp: , Rfl:     rosuvastatin (CRESTOR) 20 MG tablet, , Disp: , Rfl:     tiZANidine (ZANAFLEX) 4 MG tablet, , Disp: , Rfl:     KLOR-CON M20 20 mEq tablet, , Disp: , Rfl:         Objective:     Vitals:  Blood pressure 113/70, weight 72.3 kg (159 lb 8 oz).    Physical Examination:   GEN: wn/wd female in no apparent distress  SKIN: no rashes, no sclerodactyly, no Raynaud's, no periungual erythema, no digital tip ulcerations, no nailbed pitting  HEAD: no alopecia, no scalp tenderness, no temporal artery tenderness or induration.  EYES: no pallor, no icterus, PERRLA  ENT:  no thrush, no mucosal dryness or ulcerations, adequate oral hygiene & dentition.  NECK: supple x 6, no masses, no thyromegaly, no lymphadenopathy.  CV:   S1 and S2 regular, no murmurs, gallop or rubs  CHEST: Normal respiratory effort;  normal breath sounds/no adventitious sounds. No signs of consolidation.  ABD: non-tender and non-distended; soft; normal bowel sounds; no rebound/guarding or tenderness. No hepatosplenomegaly.  Musculoskeletal:  Chronic synovial proliferation unchanged without evidence of active synovitis or progressive deformity  EXTREM: no clubbing, cyanosis or edema. normal pulses.  NEURO:  grossly intact; motor/sensory WNL; no tremors  PSYCH:  normal mood, affect and behavior            Labs:   Lab Results   Component Value Date    WBC 6.0 11/29/2017    HGB 11.5  (L) 11/29/2017    HCT 34.2 (L) 11/29/2017    MCV 83.2 11/29/2017     11/29/2017   CMP@  Sodium   Date Value Ref Range Status   11/29/2017 139 134 - 144 mmol/L      Potassium   Date Value Ref Range Status   11/29/2017 3.7 3.5 - 5.0 mmol/L      Chloride   Date Value Ref Range Status   11/29/2017 105 98 - 110 mmol/L      CO2   Date Value Ref Range Status   11/29/2017 23.7 22.8 - 31.6 mmol/L      Glucose   Date Value Ref Range Status   11/29/2017 187 (H) 70 - 99 mg/dL      BUN, Bld   Date Value Ref Range Status   11/29/2017 9 8 - 20 mg/dL      Creatinine   Date Value Ref Range Status   11/29/2017 0.55 (L) 0.60 - 1.40 mg/dL      Calcium   Date Value Ref Range Status   11/29/2017 9.3 7.7 - 10.4 mg/dL      Total Protein   Date Value Ref Range Status   11/29/2017 7.1 6.0 - 8.2 g/dL      Albumin   Date Value Ref Range Status   11/29/2017 4.1 3.1 - 4.7 g/dL      Total Bilirubin   Date Value Ref Range Status   11/29/2017 1.3 (H) 0.3 - 1.0 mg/dL      Alkaline Phosphatase   Date Value Ref Range Status   11/29/2017 84 40 - 104 IU/L      AST   Date Value Ref Range Status   11/29/2017 32 10 - 40 IU/L      CPK   Date Value Ref Range Status   11/29/2017 88 13 - 135 IU/L      CRP   Date Value Ref Range Status   11/29/2017 0.32 0.00 - 1.40 mg/dL      Rheumatoid Factor   Date Value Ref Range Status   07/17/2017 <10.0 0.0 - 13.9 IU/mL      Comment:     Performed at: [facility], [facility] [address], [place], [id number] Can MD, Phone:  [phone]         Radiology/Diagnostic Studies:    None    Assessment/Discussion/Plan:   58 y.o. female with chronic inflammatory arthritis/psoriatic arthritis- stable on Enbrel      PLAN:  I will continue her medication without change. Routine blood testing was obtained.      RTC:  I will see her back in 6 months.      Electronically signed by Jed Knox MD

## 2019-06-11 DIAGNOSIS — M19.90 CHRONIC INFLAMMATORY ARTHRITIS: ICD-10-CM

## 2019-06-11 RX ORDER — ETANERCEPT 50 MG/ML
50 SOLUTION SUBCUTANEOUS
Qty: 12 SYRINGE | Refills: 1 | Status: SHIPPED | OUTPATIENT
Start: 2019-06-11 | End: 2020-05-09

## 2019-12-11 ENCOUNTER — OFFICE VISIT (OUTPATIENT)
Dept: RHEUMATOLOGY | Facility: CLINIC | Age: 59
End: 2019-12-11
Payer: MEDICARE

## 2019-12-11 VITALS
DIASTOLIC BLOOD PRESSURE: 76 MMHG | WEIGHT: 165.88 LBS | BODY MASS INDEX: 29.39 KG/M2 | SYSTOLIC BLOOD PRESSURE: 135 MMHG

## 2019-12-11 DIAGNOSIS — M05.79 RHEUMATOID ARTHRITIS INVOLVING MULTIPLE SITES WITH POSITIVE RHEUMATOID FACTOR: Primary | ICD-10-CM

## 2019-12-11 DIAGNOSIS — Z79.899 ENCOUNTER FOR LONG-TERM (CURRENT) DRUG USE: ICD-10-CM

## 2019-12-11 DIAGNOSIS — M19.90 OSTEOARTHRITIS, UNSPECIFIED OSTEOARTHRITIS TYPE, UNSPECIFIED SITE: ICD-10-CM

## 2019-12-11 PROCEDURE — 99214 OFFICE O/P EST MOD 30 MIN: CPT | Mod: S$GLB,,, | Performed by: INTERNAL MEDICINE

## 2019-12-11 PROCEDURE — 3008F BODY MASS INDEX DOCD: CPT | Mod: S$GLB,,, | Performed by: INTERNAL MEDICINE

## 2019-12-11 PROCEDURE — 3008F PR BODY MASS INDEX (BMI) DOCUMENTED: ICD-10-PCS | Mod: S$GLB,,, | Performed by: INTERNAL MEDICINE

## 2019-12-11 PROCEDURE — 99214 PR OFFICE/OUTPT VISIT, EST, LEVL IV, 30-39 MIN: ICD-10-PCS | Mod: S$GLB,,, | Performed by: INTERNAL MEDICINE

## 2019-12-11 NOTE — PROGRESS NOTES
[facility] RHEUMATOLOGY           Follow-up visit    Notes dictated to M*Modal. Please forgive any unintended errors.  Subjective:       Patient ID:   NAME: Gillian Ramírez : 1960     59 y.o. female    Referring Doc: No ref. provider found  Other Physicians:    Chief Complaint:  Rheumatoid Arthritis (Needs refill on Enbrel)      HPI/Interval History:  The patient is having no significant issues related to her rheumatoid arthritis.  She has no red, hot, and/or swollen joints.  She does however have much increased knee pain, most especially on the left side, over the last month or so.  She states that she feels in entirely different sensation than she had previously, now feeling like something is penetrating down the tibia every time she takes a step or make some movement.  This began suddenly though she does not remember any popping or snapping and did not fall.  She was to have total knee replacements early this year but those were postponed due to yet another cardiac stent placement.  Just last month, another stent was placed.  Therefore, she is not a candidate still at least next summer for knee replacement.  She is going to have an appointment this month with Orthopedics to determine exactly what has changed and whether there is some new, internal derangement present.    ROS:   GEN:    no fever, night sweats or weight loss  SKIN:   no rashes, erythema, bruising, or swelling, no Raynauds, no photosensitivity  HEENT: no changes in vision, no mouth ulcers, no sicca symptoms, no scalp tenderness, no jaw claudication.  CV:      no CP, PND, SHARIF, orthopnea, no palpitations  PULM: no SOB, cough, hemoptysis, sputum or pleuritic pain  GI:       no GERD, no dysphagia, no hematemesis, no abdominal pain, nausea, vomiting, constipation, diarrhea, melanotic stools, BRBPR  :      no hematuria, dysuria  NEURO: no paresthesias, headaches, acute visual disturbances  MUSCULOSKELETAL:  See above  PSYCH:   No increased  insomnia, no increased anxiety, no increased depression    Past Medical/Surgical History:  Past Medical History:   Diagnosis Date    CAD (coronary artery disease)     Psoriatic arthritis      Past Surgical History:   Procedure Laterality Date    ARTERIAL BYPASS SURGRY      stents x 5    BACK SURGERY      fusion L5-S1    CHOLECYSTECTOMY      HYSTERECTOMY      partial       Allergies:  Review of patient's allergies indicates:  No Known Allergies    Social/Family History:  Social History     Socioeconomic History    Marital status: Single     Spouse name: Not on file    Number of children: Not on file    Years of education: Not on file    Highest education level: Not on file   Occupational History    Not on file   Social Needs    Financial resource strain: Not on file    Food insecurity:     Worry: Not on file     Inability: Not on file    Transportation needs:     Medical: Not on file     Non-medical: Not on file   Tobacco Use    Smoking status: Former Smoker     Packs/day: 1.00     Years: 10.00     Pack years: 10.00     Types: Cigarettes     Last attempt to quit:      Years since quittin.9    Smokeless tobacco: Never Used   Substance and Sexual Activity    Alcohol use: No    Drug use: No    Sexual activity: Not on file   Lifestyle    Physical activity:     Days per week: Not on file     Minutes per session: Not on file    Stress: Not on file   Relationships    Social connections:     Talks on phone: Not on file     Gets together: Not on file     Attends Hindu service: Not on file     Active member of club or organization: Not on file     Attends meetings of clubs or organizations: Not on file     Relationship status: Not on file   Other Topics Concern    Not on file   Social History Narrative    Not on file     Family History   Problem Relation Age of Onset    Heart disease Mother     Diabetes Mellitus Mother     Heart disease Father      FAMILY HISTORY: negative for Connective  Tissue Disease      Medications:    Current Outpatient Medications:     clopidogrel (PLAVIX) 75 mg tablet, Take by mouth., Disp: , Rfl:     diltiaZEM (CARDIZEM CD) 240 MG 24 hr capsule, , Disp: , Rfl:     duloxetine (CYMBALTA) 30 MG capsule, Take by mouth., Disp: , Rfl:     ENBREL SURECLICK 50 mg/mL (0.98 mL) PnIj, Inject 50 mg into the skin every 7 days., Disp: 12 Syringe, Rfl: 1    esomeprazole (NEXIUM) 40 MG capsule, Take 40 mg by mouth before breakfast., Disp: , Rfl:     ezetimibe (ZETIA) 10 mg tablet, , Disp: , Rfl:     folic acid (FOLVITE) 1 MG tablet, Take by mouth., Disp: , Rfl:     gabapentin (NEURONTIN) 100 MG capsule, Take by mouth., Disp: , Rfl:     hydrocodone-acetaminophen 10-325mg (NORCO)  mg Tab, , Disp: , Rfl:     KLOR-CON M20 20 mEq tablet, , Disp: , Rfl:     metformin (GLUCOPHAGE) 500 MG tablet, Take by mouth., Disp: , Rfl:     metoprolol tartrate (LOPRESSOR) 25 MG tablet, , Disp: , Rfl:     nitroGLYCERIN 0.1 mg/hr TD PT24 (NITRODUR) 0.1 mg/hr PT24, , Disp: , Rfl:     ramipril (ALTACE) 2.5 MG capsule, Take by mouth., Disp: , Rfl:     RANEXA 500 mg Tb12, , Disp: , Rfl:     rosuvastatin (CRESTOR) 20 MG tablet, , Disp: , Rfl:     tiZANidine (ZANAFLEX) 4 MG tablet, , Disp: , Rfl:       Objective:     Vitals:  Blood pressure 135/76, weight 75.3 kg (165 lb 14.4 oz).    Physical Examination:   GEN: wn/wd female in no apparent distress  SKIN: no rashes, no sclerodactyly, no Raynaud's, no periungual erythema, no digital tip ulcerations, no nailbed pitting  HEAD: no alopecia, no scalp tenderness, no temporal artery tenderness or induration.  EYES: no pallor, no icterus, PERRLA  ENT:  no thrush, no mucosal dryness or ulcerations, adequate oral hygiene & dentition.  NECK: supple x 6, no masses, no thyromegaly, no lymphadenopathy.  CV:   S1 and S2 regular, no murmurs, gallop or rubs  CHEST: Normal respiratory effort;  normal breath sounds/no adventitious sounds. No signs of  consolidation.  ABD: non-tender and non-distended; soft; normal bowel sounds; no rebound/guarding or tenderness. No hepatosplenomegaly.  Musculoskeletal:  Chronic rheumatoid changes persist have not worsened.  There is no evidence of active synovitis at any level in the hands.  There is a moderate effusion of the left knee present with mild to moderate warmth.  I did not do a deeper examination due to her discomfort  EXTREM: no clubbing, cyanosis or edema. normal pulses.  NEURO:  grossly intact; motor/sensory WNL; no tremors  PSYCH:  normal mood, affect and behavior    Labs:   Lab Results   Component Value Date    WBC 7.7 06/05/2019    HGB 10.9 (L) 06/05/2019    HCT 34.6 (L) 06/05/2019    MCV 83.0 06/05/2019     06/05/2019   CMP@  Sodium   Date Value Ref Range Status   06/05/2019 136 134 - 144 mmol/L      Potassium   Date Value Ref Range Status   06/05/2019 3.7 3.5 - 5.0 mmol/L      Chloride   Date Value Ref Range Status   06/05/2019 100 98 - 110 mmol/L      CO2   Date Value Ref Range Status   06/05/2019 26.4 22.8 - 31.6 mmol/L      Glucose   Date Value Ref Range Status   06/05/2019 152 (H) 70 - 99 mg/dL      BUN, Bld   Date Value Ref Range Status   06/05/2019 11 8 - 20 mg/dL      Creatinine   Date Value Ref Range Status   06/05/2019 0.69 0.60 - 1.40 mg/dL      Calcium   Date Value Ref Range Status   06/05/2019 9.5 7.7 - 10.4 mg/dL      Total Protein   Date Value Ref Range Status   06/05/2019 7.8 6.0 - 8.2 g/dL      Albumin   Date Value Ref Range Status   06/05/2019 3.8 3.1 - 4.7 g/dL      Total Bilirubin   Date Value Ref Range Status   06/05/2019 1.0 0.3 - 1.0 mg/dL      Alkaline Phosphatase   Date Value Ref Range Status   06/05/2019 109 (H) 40 - 104 IU/L      AST   Date Value Ref Range Status   06/05/2019 19 10 - 40 IU/L      CPK   Date Value Ref Range Status   11/29/2017 88 13 - 135 IU/L      CRP   Date Value Ref Range Status   06/05/2019 2.96 (H) 0.00 - 1.40 mg/dL      Rheumatoid Factor   Date Value Ref  Range Status   07/17/2017 <10.0 0.0 - 13.9 IU/mL      Comment:     Performed at: [facility], [facility] [id number] [facility], [place], [place], [id number] Can MD, Phone:  [phone]       Radiology/Diagnostic Studies:    None    Assessment/Discussion/Plan:   59 y.o. female with rheumatoid arthritis-stable on Enbrel  2) osteoarthritis with potential acute internal derangement of the left knee-evaluation by Orthopedics pending    PLAN:  I have asked her to stay off her leg as much as possible.  She is to use a splint or brace on the left side.  I have also advised her to use a cane on the opposite side.  No impact is allowed under any circumstances.  She has pain medication from pain management.  I have asked that a copy of the orthopedic evaluation be sent to me.  Routine blood testing was ordered; this can be performed at a convenient place for her.    RTC:  I will see her back in 6 months    Electronically signed by Jed Knox MD

## 2020-05-07 DIAGNOSIS — M19.90 CHRONIC INFLAMMATORY ARTHRITIS: ICD-10-CM

## 2020-05-09 RX ORDER — ETANERCEPT 50 MG/ML
SOLUTION SUBCUTANEOUS
Qty: 4 ML | Refills: 11 | Status: SHIPPED | OUTPATIENT
Start: 2020-05-09 | End: 2020-05-15

## 2020-05-15 DIAGNOSIS — M19.90 CHRONIC INFLAMMATORY ARTHRITIS: ICD-10-CM

## 2020-05-15 RX ORDER — ETANERCEPT 50 MG/ML
SOLUTION SUBCUTANEOUS
Qty: 4 ML | Refills: 5 | Status: SHIPPED | OUTPATIENT
Start: 2020-05-15

## 2020-06-16 ENCOUNTER — OFFICE VISIT (OUTPATIENT)
Dept: RHEUMATOLOGY | Facility: CLINIC | Age: 60
End: 2020-06-16
Payer: MEDICARE

## 2020-06-16 VITALS
BODY MASS INDEX: 28.15 KG/M2 | SYSTOLIC BLOOD PRESSURE: 130 MMHG | DIASTOLIC BLOOD PRESSURE: 78 MMHG | TEMPERATURE: 97 F | WEIGHT: 158.88 LBS

## 2020-06-16 DIAGNOSIS — Z79.899 ENCOUNTER FOR LONG-TERM (CURRENT) DRUG USE: ICD-10-CM

## 2020-06-16 DIAGNOSIS — M19.90 OSTEOARTHRITIS, UNSPECIFIED OSTEOARTHRITIS TYPE, UNSPECIFIED SITE: ICD-10-CM

## 2020-06-16 DIAGNOSIS — L40.50 PSORIATIC ARTHRITIS: Primary | ICD-10-CM

## 2020-06-16 PROCEDURE — 99213 PR OFFICE/OUTPT VISIT, EST, LEVL III, 20-29 MIN: ICD-10-PCS | Mod: S$GLB,,, | Performed by: INTERNAL MEDICINE

## 2020-06-16 PROCEDURE — 99213 OFFICE O/P EST LOW 20 MIN: CPT | Mod: S$GLB,,, | Performed by: INTERNAL MEDICINE

## 2020-06-16 PROCEDURE — 3008F BODY MASS INDEX DOCD: CPT | Mod: S$GLB,,, | Performed by: INTERNAL MEDICINE

## 2020-06-16 PROCEDURE — 3008F PR BODY MASS INDEX (BMI) DOCUMENTED: ICD-10-PCS | Mod: S$GLB,,, | Performed by: INTERNAL MEDICINE

## 2020-06-16 RX ORDER — ROSUVASTATIN CALCIUM 40 MG/1
TABLET, COATED ORAL
COMMUNITY
Start: 2020-06-05

## 2020-06-16 RX ORDER — GABAPENTIN 300 MG/1
CAPSULE ORAL 2 TIMES DAILY
COMMUNITY
Start: 2020-06-05

## 2020-06-16 NOTE — PROGRESS NOTES
[facility] RHEUMATOLOGY           Follow-up visit    Notes dictated to M*Modal. Please forgive any unintended errors.  Subjective:       Patient ID:   NAME: Gillian Ramírez : 1960     59 y.o. female    Referring Doc: No ref. provider found  Other Physicians:    Chief Complaint:  Rheumatoid Arthritis      HPI/Interval History:  The patient has no complaints of active inflammatory disease.  She has not had any red, hot, and/or swollen joints.  She has gotten significantly worse with regard to the lower extremities.  Surgery was planned for the right hip but she recently required another cardiac stent and therefore her surgery has been postponed for 1 additional year.    ROS:   GEN:    no fever, night sweats or weight loss  SKIN:   no rashes, erythema, bruising, or swelling, no Raynauds, no photosensitivity  HEENT: no changes in vision, no mouth ulcers, no sicca symptoms, no scalp tenderness, no jaw claudication.  CV:      no CP, PND, SHARIF, orthopnea, no palpitations  PULM: no SOB, cough, hemoptysis, sputum or pleuritic pain  GI:       no GERD, no dysphagia, no hematemesis, no abdominal pain, nausea, vomiting, constipation, diarrhea, melanotic stools, BRBPR  :      no hematuria, dysuria  NEURO: no paresthesias, headaches, acute visual disturbances  MUSCULOSKELETAL:  As above  PSYCH:   No increased insomnia, no increased anxiety, no increased depression    Past Medical/Surgical History:  Past Medical History:   Diagnosis Date    CAD (coronary artery disease)     Psoriatic arthritis      Past Surgical History:   Procedure Laterality Date    ARTERIAL BYPASS SURGRY      stents x 5    BACK SURGERY      fusion L5-S1    CHOLECYSTECTOMY      HYSTERECTOMY      partial       Allergies:  Review of patient's allergies indicates:  No Known Allergies    Social/Family History:  Social History     Socioeconomic History    Marital status: Single     Spouse name: Not on file    Number of children: Not on file    Years  of education: Not on file    Highest education level: Not on file   Occupational History    Not on file   Social Needs    Financial resource strain: Not on file    Food insecurity     Worry: Not on file     Inability: Not on file    Transportation needs     Medical: Not on file     Non-medical: Not on file   Tobacco Use    Smoking status: Former Smoker     Packs/day: 1.00     Years: 10.00     Pack years: 10.00     Types: Cigarettes     Quit date:      Years since quittin.4    Smokeless tobacco: Never Used   Substance and Sexual Activity    Alcohol use: No    Drug use: No    Sexual activity: Not on file   Lifestyle    Physical activity     Days per week: Not on file     Minutes per session: Not on file    Stress: Not on file   Relationships    Social connections     Talks on phone: Not on file     Gets together: Not on file     Attends Voodoo service: Not on file     Active member of club or organization: Not on file     Attends meetings of clubs or organizations: Not on file     Relationship status: Not on file   Other Topics Concern    Not on file   Social History Narrative    Not on file     Family History   Problem Relation Age of Onset    Heart disease Mother     Diabetes Mellitus Mother     Heart disease Father      FAMILY HISTORY: negative for Connective Tissue Disease      Medications:    Current Outpatient Medications:     clopidogrel (PLAVIX) 75 mg tablet, Take by mouth., Disp: , Rfl:     diltiaZEM (CARDIZEM CD) 240 MG 24 hr capsule, , Disp: , Rfl:     duloxetine (CYMBALTA) 30 MG capsule, Take by mouth., Disp: , Rfl:     ENBREL SURECLICK 50 mg/mL (1 mL) PnIj, INJECT 50MG SUB-Q ONCE EVERY 7 DAYS AS DIRECTED., Disp: 4 mL, Rfl: 5    esomeprazole (NEXIUM) 40 MG capsule, Take 40 mg by mouth before breakfast., Disp: , Rfl:     ezetimibe (ZETIA) 10 mg tablet, , Disp: , Rfl:     folic acid (FOLVITE) 1 MG tablet, Take by mouth., Disp: , Rfl:     gabapentin (NEURONTIN) 300 MG  capsule, 2 (two) times daily. , Disp: , Rfl:     hydrocodone-acetaminophen 10-325mg (NORCO)  mg Tab, , Disp: , Rfl:     KLOR-CON M20 20 mEq tablet, , Disp: , Rfl:     metformin (GLUCOPHAGE) 500 MG tablet, Take by mouth., Disp: , Rfl:     metoprolol tartrate (LOPRESSOR) 25 MG tablet, 3 (three) times daily. , Disp: , Rfl:     nitroGLYCERIN 0.1 mg/hr TD PT24 (NITRODUR) 0.1 mg/hr PT24, , Disp: , Rfl:     ramipril (ALTACE) 2.5 MG capsule, Take by mouth., Disp: , Rfl:     RANEXA 500 mg Tb12, , Disp: , Rfl:     rosuvastatin (CRESTOR) 40 MG Tab, , Disp: , Rfl:     tiZANidine (ZANAFLEX) 4 MG tablet, , Disp: , Rfl:       Objective:     Vitals:  Blood pressure 130/78, temperature 97.2 °F (36.2 °C), weight 72.1 kg (158 lb 14.4 oz).    Physical Examination:   GEN: wn/wd female in no apparent distress  SKIN: no rashes, no sclerodactyly, no Raynaud's, no periungual erythema, no digital tip ulcerations, no nailbed pitting  HEAD: no alopecia, no scalp tenderness, no temporal artery tenderness or induration.  EYES: no pallor, no icterus, PERRLA  ENT:  no thrush, no mucosal dryness or ulcerations, adequate oral hygiene & dentition.  NECK: supple x 6, no masses, no thyromegaly, no lymphadenopathy.  CV:   S1 and S2 regular, no murmurs, gallop or rubs  CHEST: Normal respiratory effort;  normal breath sounds/no adventitious sounds. No signs of consolidation.  ABD: non-tender and non-distended; soft; normal bowel sounds; no rebound/guarding or tenderness. No hepatosplenomegaly.  Musculoskeletal:  No evidence of active synovitis.  No progression of chronic deformities  EXTREM: no clubbing, cyanosis or edema. normal pulses.  NEURO:  grossly intact; motor/sensory WNL; no tremors  PSYCH:  normal mood, affect and behavior    Labs:   Lab Results   Component Value Date    WBC 7.7 06/05/2019    HGB 10.9 (L) 06/05/2019    HCT 34.6 (L) 06/05/2019    MCV 83.0 06/05/2019     06/05/2019   CMP@  Sodium   Date Value Ref Range Status    06/05/2019 136 134 - 144 mmol/L      Potassium   Date Value Ref Range Status   06/05/2019 3.7 3.5 - 5.0 mmol/L      Chloride   Date Value Ref Range Status   06/05/2019 100 98 - 110 mmol/L      CO2   Date Value Ref Range Status   06/05/2019 26.4 22.8 - 31.6 mmol/L      Glucose   Date Value Ref Range Status   06/05/2019 152 (H) 70 - 99 mg/dL      BUN, Bld   Date Value Ref Range Status   06/05/2019 11 8 - 20 mg/dL      Creatinine   Date Value Ref Range Status   06/05/2019 0.69 0.60 - 1.40 mg/dL      Calcium   Date Value Ref Range Status   06/05/2019 9.5 7.7 - 10.4 mg/dL      Total Protein   Date Value Ref Range Status   06/05/2019 7.8 6.0 - 8.2 g/dL      Albumin   Date Value Ref Range Status   06/05/2019 3.8 3.1 - 4.7 g/dL      Total Bilirubin   Date Value Ref Range Status   06/05/2019 1.0 0.3 - 1.0 mg/dL      Alkaline Phosphatase   Date Value Ref Range Status   06/05/2019 109 (H) 40 - 104 IU/L      AST   Date Value Ref Range Status   06/05/2019 19 10 - 40 IU/L      CPK   Date Value Ref Range Status   11/29/2017 88 13 - 135 IU/L      CRP   Date Value Ref Range Status   06/05/2019 2.96 (H) 0.00 - 1.40 mg/dL      Rheumatoid Factor   Date Value Ref Range Status   07/17/2017 <10.0 0.0 - 13.9 IU/mL      Comment:     Performed at: [facility] [address], [id number] Can MD, Phone:  [phone]       Radiology/Diagnostic Studies:    None    Assessment/Discussion/Plan:   59 y.o. female with psoriatic arthritis-stable on Enbrel  2) osteoarthritis of the hips and knees-end-stage    PLAN:  I will continue her medication without change.  Routine blood testing was ordered.    RTC:  I will see her back in 6 months.    Electronically signed by Jed Knox MD